# Patient Record
Sex: FEMALE | Race: WHITE | URBAN - METROPOLITAN AREA
[De-identification: names, ages, dates, MRNs, and addresses within clinical notes are randomized per-mention and may not be internally consistent; named-entity substitution may affect disease eponyms.]

---

## 2019-12-30 ENCOUNTER — APPOINTMENT (RX ONLY)
Dept: URBAN - METROPOLITAN AREA CLINIC 71 | Facility: CLINIC | Age: 29
Setting detail: DERMATOLOGY
End: 2019-12-30

## 2019-12-30 ENCOUNTER — APPOINTMENT (RX ONLY)
Dept: URBAN - METROPOLITAN AREA CLINIC 70 | Facility: CLINIC | Age: 29
Setting detail: DERMATOLOGY
End: 2019-12-30

## 2019-12-30 DIAGNOSIS — R21 RASH AND OTHER NONSPECIFIC SKIN ERUPTION: ICD-10-CM

## 2019-12-30 PROCEDURE — ? COUNSELING

## 2019-12-30 PROCEDURE — ? PRESCRIPTION

## 2019-12-30 PROCEDURE — ? TREATMENT REGIMEN

## 2019-12-30 PROCEDURE — 99202 OFFICE O/P NEW SF 15 MIN: CPT

## 2019-12-30 RX ADMIN — TRIAMCINOLONE ACETONIDE 1: 1 OINTMENT TOPICAL at 00:00

## 2019-12-30 ASSESSMENT — LOCATION SIMPLE DESCRIPTION DERM
LOCATION SIMPLE: RIGHT HAND
LOCATION SIMPLE: LEFT HAND
LOCATION SIMPLE: LEFT ELBOW
LOCATION SIMPLE: LEFT ANKLE
LOCATION SIMPLE: LEFT ANKLE
LOCATION SIMPLE: LEFT ELBOW
LOCATION SIMPLE: RIGHT PRETIBIAL REGION
LOCATION SIMPLE: RIGHT HAND
LOCATION SIMPLE: RIGHT PRETIBIAL REGION
LOCATION SIMPLE: RIGHT KNEE
LOCATION SIMPLE: RIGHT KNEE
LOCATION SIMPLE: LEFT KNEE
LOCATION SIMPLE: LEFT KNEE
LOCATION SIMPLE: RIGHT ELBOW
LOCATION SIMPLE: LEFT HAND
LOCATION SIMPLE: RIGHT ELBOW

## 2019-12-30 ASSESSMENT — LOCATION DETAILED DESCRIPTION DERM
LOCATION DETAILED: RIGHT DISTAL PRETIBIAL REGION
LOCATION DETAILED: RIGHT DISTAL PRETIBIAL REGION
LOCATION DETAILED: LEFT ELBOW
LOCATION DETAILED: RIGHT ELBOW
LOCATION DETAILED: RIGHT ELBOW
LOCATION DETAILED: LEFT KNEE
LOCATION DETAILED: LEFT ULNAR PALM
LOCATION DETAILED: RIGHT KNEE
LOCATION DETAILED: LEFT ELBOW
LOCATION DETAILED: LEFT ULNAR PALM
LOCATION DETAILED: RIGHT KNEE
LOCATION DETAILED: LEFT ANKLE
LOCATION DETAILED: LEFT KNEE
LOCATION DETAILED: RIGHT ULNAR PALM
LOCATION DETAILED: LEFT ANKLE
LOCATION DETAILED: RIGHT ULNAR PALM

## 2019-12-30 ASSESSMENT — LOCATION ZONE DERM
LOCATION ZONE: LEG
LOCATION ZONE: LEG
LOCATION ZONE: ARM
LOCATION ZONE: ARM
LOCATION ZONE: HAND
LOCATION ZONE: HAND

## 2019-12-30 NOTE — PROCEDURE: TREATMENT REGIMEN
Plan: vanicream with hand washing.\\nTAC oint BID to hands, feet; do not treat elbows and knees to prepare for biopsy.\\nWill plan to biopsy at follow up. Due to new nature in onset, will allow for further development of eruption to increase yield of bx in 2 weeks.\\nPer pt, she has been worked up for psoriatic arthritis and has not shown signs of that
Detail Level: Generalized

## 2019-12-30 NOTE — PROCEDURE: TREATMENT REGIMEN
Detail Level: Generalized
Plan: vanicream with hand washing.\\nTAC oint BID to hands, feet; do not treat elbows and knees to prepare for biopsy.\\nWill plan to biopsy at follow up. Due to new nature in onset, will allow for further development of eruption to increase yield of bx in 2 weeks.\\nPer pt, she has been worked up for psoriatic arthritis and has not shown signs of that

## 2019-12-30 NOTE — HPI: RASH
What Type Of Note Output Would You Prefer (Optional)?: Standard Output
Is The Patient Presenting As Previously Scheduled?: Yes
How Severe Is Your Rash?: moderate
Is This A New Presentation, Or A Follow-Up?: Rash
Additional History: States she was diagnosed with right hip gout in 2017. Stopped the med a couple of days ago to prepare for pregnancy.  She sees rheumatology Dr. Minaya

## 2019-12-30 NOTE — HPI: RASH
What Type Of Note Output Would You Prefer (Optional)?: Standard Output
Is The Patient Presenting As Previously Scheduled?: Yes
How Severe Is Your Rash?: moderate
Is This A New Presentation, Or A Follow-Up?: Rash
Additional History: States she was diagnosed with right hip gout in 2017. Stopped the med a couple of days ago to prepare for pregnancy.  She sees rheumatology Dr. Lorenz

## 2019-12-31 RX ORDER — TRIAMCINOLONE ACETONIDE 1 MG/G
1 OINTMENT TOPICAL BID
Qty: 1 | Refills: 0 | Status: ERX | COMMUNITY
Start: 2019-12-30

## 2020-01-14 ENCOUNTER — APPOINTMENT (RX ONLY)
Dept: URBAN - METROPOLITAN AREA CLINIC 70 | Facility: CLINIC | Age: 30
Setting detail: DERMATOLOGY
End: 2020-01-14

## 2020-01-14 ENCOUNTER — APPOINTMENT (RX ONLY)
Dept: URBAN - METROPOLITAN AREA CLINIC 71 | Facility: CLINIC | Age: 30
Setting detail: DERMATOLOGY
End: 2020-01-14

## 2020-01-14 DIAGNOSIS — R21 RASH AND OTHER NONSPECIFIC SKIN ERUPTION: ICD-10-CM

## 2020-01-14 DIAGNOSIS — L26 EXFOLIATIVE DERMATITIS: ICD-10-CM

## 2020-01-14 PROCEDURE — 11104 PUNCH BX SKIN SINGLE LESION: CPT

## 2020-01-14 PROCEDURE — ? COUNSELING

## 2020-01-14 PROCEDURE — ? BIOPSY BY PUNCH METHOD

## 2020-01-14 PROCEDURE — ? TREATMENT REGIMEN

## 2020-01-14 PROCEDURE — 99213 OFFICE O/P EST LOW 20 MIN: CPT | Mod: 25

## 2020-01-14 ASSESSMENT — LOCATION DETAILED DESCRIPTION DERM
LOCATION DETAILED: LEFT ULNAR PALM
LOCATION DETAILED: LEFT PLANTAR FOREFOOT OVERLYING 3RD METATARSAL
LOCATION DETAILED: RIGHT ACHILLES SKIN
LOCATION DETAILED: LEFT THENAR EMINENCE
LOCATION DETAILED: RIGHT MEDIAL PLANTAR MIDFOOT
LOCATION DETAILED: RIGHT PLANTAR FOREFOOT OVERLYING 2ND METATARSAL
LOCATION DETAILED: LEFT PLANTAR FOREFOOT OVERLYING 3RD METATARSAL
LOCATION DETAILED: RIGHT THENAR EMINENCE
LOCATION DETAILED: RIGHT PLANTAR FOREFOOT OVERLYING 2ND METATARSAL
LOCATION DETAILED: RIGHT INSTEP
LOCATION DETAILED: RIGHT ACHILLES SKIN
LOCATION DETAILED: RIGHT THENAR EMINENCE
LOCATION DETAILED: LEFT MEDIAL PLANTAR MIDFOOT
LOCATION DETAILED: LEFT MEDIAL PLANTAR HEEL
LOCATION DETAILED: LEFT THENAR EMINENCE
LOCATION DETAILED: LEFT MEDIAL PLANTAR HEEL
LOCATION DETAILED: RIGHT INSTEP
LOCATION DETAILED: LEFT MEDIAL PLANTAR MIDFOOT
LOCATION DETAILED: RIGHT MEDIAL PLANTAR MIDFOOT
LOCATION DETAILED: LEFT ULNAR PALM

## 2020-01-14 ASSESSMENT — LOCATION SIMPLE DESCRIPTION DERM
LOCATION SIMPLE: RIGHT PLANTAR SURFACE
LOCATION SIMPLE: LEFT PLANTAR SURFACE
LOCATION SIMPLE: RIGHT HAND
LOCATION SIMPLE: RIGHT ACHILLES SKIN
LOCATION SIMPLE: LEFT HAND
LOCATION SIMPLE: LEFT PLANTAR SURFACE
LOCATION SIMPLE: RIGHT HAND
LOCATION SIMPLE: RIGHT ACHILLES SKIN
LOCATION SIMPLE: RIGHT PLANTAR SURFACE
LOCATION SIMPLE: LEFT HAND

## 2020-01-14 ASSESSMENT — LOCATION ZONE DERM
LOCATION ZONE: FEET
LOCATION ZONE: LEG
LOCATION ZONE: LEG
LOCATION ZONE: HAND
LOCATION ZONE: HAND
LOCATION ZONE: FEET

## 2020-01-14 NOTE — PROCEDURE: BIOPSY BY PUNCH METHOD
Render In Bullet Format When Appropriate: No
Dressing: bandage
Was A Bandage Applied: Yes
Punch Size In Mm: 4
Anesthesia Volume In Cc: 0.5
Epidermal Sutures: 4-0 Polysorb
Notification Instructions: Patient will follow up in 2 weeks for biopsy results.
Size Of Lesion In Cm (Optional): 0
Wound Care: Aquaphor
Detail Level: Detailed
Home Suture Removal Text: Patient was provided a home suture removal kit and will remove their sutures at home.  If they have any questions or difficulties they will call the office.
Additional Anesthesia Volume In Cc (Will Not Render If 0): 6
Consent: Verbal consent was obtained and risks were reviewed including but not limited to scarring, infection, bleeding, scabbing, incomplete removal, nerve damage and allergy to anesthesia.
Anesthesia Type: 1% lidocaine with epinephrine
Biopsy Type: H and E
Billing Type: Third-Party Bill
Suture Removal: 14 days
Post-Care Instructions: I reviewed with the patient in detail post-care instructions. Patient is to keep the biopsy site dry overnight, and then apply bacitracin twice daily until healed. Patient may apply hydrogen peroxide soaks to remove any crusting.
Hemostasis: None

## 2020-01-14 NOTE — PROCEDURE: TREATMENT REGIMEN
Plan: Vaniply, cover with manicure  gloves.\\nr/o strep-associated exfoliative dermatitis of hands/feet. Advised pt  to  see PCP for strep culture.\\nTAC  oint BID to  feet
Detail Level: Generalized

## 2020-01-14 NOTE — PROCEDURE: BIOPSY BY PUNCH METHOD
Detail Level: Detailed
Was A Bandage Applied: Yes
Punch Size In Mm: 4
Biopsy Type: H and E
Anesthesia Type: 1% lidocaine with epinephrine
Anesthesia Volume In Cc: 0.5
Additional Anesthesia Volume In Cc (Will Not Render If 0): 6
Hemostasis: None
Epidermal Sutures: 4-0 Polysorb
Wound Care: Aquaphor
Dressing: bandage
Suture Removal: 14 days
Patient Will Remove Sutures At Home?: No
Size Of Lesion In Cm (Optional): 0
Lab: [id number]
Lab Facility: [id number]
Consent: Verbal consent was obtained and risks were reviewed including but not limited to scarring, infection, bleeding, scabbing, incomplete removal, nerve damage and allergy to anesthesia.
Post-Care Instructions: I reviewed with the patient in detail post-care instructions. Patient is to keep the biopsy site dry overnight, and then apply bacitracin twice daily until healed. Patient may apply hydrogen peroxide soaks to remove any crusting.
Home Suture Removal Text: Patient was provided a home suture removal kit and will remove their sutures at home.  If they have any questions or difficulties they will call the office.
Notification Instructions: Patient will follow up in 2 weeks for biopsy results.
Billing Type: Third-Party Bill

## 2020-01-30 ENCOUNTER — APPOINTMENT (RX ONLY)
Dept: URBAN - METROPOLITAN AREA CLINIC 70 | Facility: CLINIC | Age: 30
Setting detail: DERMATOLOGY
End: 2020-01-30

## 2020-01-30 ENCOUNTER — APPOINTMENT (RX ONLY)
Dept: URBAN - METROPOLITAN AREA CLINIC 71 | Facility: CLINIC | Age: 30
Setting detail: DERMATOLOGY
End: 2020-01-30

## 2020-01-30 DIAGNOSIS — L259 CONTACT DERMATITIS AND OTHER ECZEMA, UNSPECIFIED CAUSE: ICD-10-CM | Status: IMPROVED

## 2020-01-30 DIAGNOSIS — L60.3 NAIL DYSTROPHY: ICD-10-CM

## 2020-01-30 PROBLEM — L30.8 OTHER SPECIFIED DERMATITIS: Status: ACTIVE | Noted: 2020-01-30

## 2020-01-30 PROCEDURE — ? COUNSELING

## 2020-01-30 PROCEDURE — 99214 OFFICE O/P EST MOD 30 MIN: CPT

## 2020-01-30 PROCEDURE — ? TREATMENT REGIMEN

## 2020-01-30 ASSESSMENT — LOCATION ZONE DERM
LOCATION ZONE: LEG
LOCATION ZONE: LEG
LOCATION ZONE: FINGERNAIL
LOCATION ZONE: FEET
LOCATION ZONE: FINGER
LOCATION ZONE: FEET
LOCATION ZONE: HAND
LOCATION ZONE: FINGERNAIL
LOCATION ZONE: FINGER
LOCATION ZONE: HAND

## 2020-01-30 ASSESSMENT — LOCATION DETAILED DESCRIPTION DERM
LOCATION DETAILED: LEFT SMALL FINGERNAIL
LOCATION DETAILED: RIGHT ACHILLES SKIN
LOCATION DETAILED: PERIUNGUAL SKIN LEFT RING FINGER
LOCATION DETAILED: RIGHT ACHILLES SKIN
LOCATION DETAILED: RIGHT MIDDLE FINGERNAIL
LOCATION DETAILED: LEFT POSTERIOR MEDIAL MALLEOLUS
LOCATION DETAILED: LEFT MIDDLE FINGERNAIL
LOCATION DETAILED: LEFT ULNAR PALM
LOCATION DETAILED: LEFT SMALL FINGERNAIL
LOCATION DETAILED: RIGHT INDEX FINGERNAIL
LOCATION DETAILED: LEFT MEDIAL PLANTAR HEEL
LOCATION DETAILED: RIGHT SMALL FINGERNAIL
LOCATION DETAILED: RIGHT INDEX FINGERNAIL
LOCATION DETAILED: PERIUNGUAL SKIN LEFT THUMB
LOCATION DETAILED: PERIUNGUAL SKIN LEFT RING FINGER
LOCATION DETAILED: RIGHT MIDDLE FINGERNAIL
LOCATION DETAILED: RIGHT RING FINGERNAIL
LOCATION DETAILED: LEFT MEDIAL PLANTAR HEEL
LOCATION DETAILED: LEFT PLANTAR FOREFOOT OVERLYING 2ND METATARSAL
LOCATION DETAILED: RIGHT MEDIAL PLANTAR HEEL
LOCATION DETAILED: RIGHT SMALL FINGERNAIL
LOCATION DETAILED: LEFT INDEX FINGERNAIL
LOCATION DETAILED: LEFT POSTERIOR MEDIAL MALLEOLUS
LOCATION DETAILED: RIGHT RING FINGERNAIL
LOCATION DETAILED: RIGHT PLANTAR FOREFOOT OVERLYING 2ND METATARSAL
LOCATION DETAILED: LEFT ULNAR PALM
LOCATION DETAILED: RIGHT PLANTAR FOREFOOT OVERLYING 2ND METATARSAL
LOCATION DETAILED: RIGHT THENAR EMINENCE
LOCATION DETAILED: LEFT INDEX FINGERNAIL
LOCATION DETAILED: LEFT MIDDLE FINGERNAIL
LOCATION DETAILED: LEFT PLANTAR FOREFOOT OVERLYING 2ND METATARSAL
LOCATION DETAILED: PERIUNGUAL SKIN LEFT THUMB
LOCATION DETAILED: RIGHT MEDIAL PLANTAR HEEL
LOCATION DETAILED: RIGHT THENAR EMINENCE

## 2020-01-30 ASSESSMENT — LOCATION SIMPLE DESCRIPTION DERM
LOCATION SIMPLE: RIGHT RING FINGERNAIL
LOCATION SIMPLE: LEFT HAND
LOCATION SIMPLE: RIGHT SMALL FINGERNAIL
LOCATION SIMPLE: RIGHT HAND
LOCATION SIMPLE: LEFT INDEX FINGERNAIL
LOCATION SIMPLE: LEFT MIDDLE FINGERNAIL
LOCATION SIMPLE: LEFT PLANTAR SURFACE
LOCATION SIMPLE: LEFT THUMB
LOCATION SIMPLE: LEFT PLANTAR SURFACE
LOCATION SIMPLE: RIGHT HAND
LOCATION SIMPLE: RIGHT MIDDLE FINGERNAIL
LOCATION SIMPLE: RIGHT INDEX FINGERNAIL
LOCATION SIMPLE: LEFT SMALL FINGERNAIL
LOCATION SIMPLE: RIGHT INDEX FINGERNAIL
LOCATION SIMPLE: RIGHT RING FINGERNAIL
LOCATION SIMPLE: RIGHT SMALL FINGERNAIL
LOCATION SIMPLE: LEFT RING FINGER
LOCATION SIMPLE: LEFT INDEX FINGERNAIL
LOCATION SIMPLE: RIGHT MIDDLE FINGERNAIL
LOCATION SIMPLE: LEFT THUMB
LOCATION SIMPLE: RIGHT ACHILLES SKIN
LOCATION SIMPLE: RIGHT PLANTAR SURFACE
LOCATION SIMPLE: LEFT HAND
LOCATION SIMPLE: LEFT SMALL FINGERNAIL
LOCATION SIMPLE: RIGHT PLANTAR SURFACE
LOCATION SIMPLE: RIGHT ACHILLES SKIN
LOCATION SIMPLE: LEFT MIDDLE FINGERNAIL
LOCATION SIMPLE: LEFT RING FINGER
LOCATION SIMPLE: LEFT ANKLE
LOCATION SIMPLE: LEFT ANKLE

## 2020-01-30 NOTE — PROCEDURE: TREATMENT REGIMEN
Continue Regimen: TAC to  soles.
Detail Level: Generalized
Plan: Etiology unclear. Biopsy  suggested eczematous  process or  partially treated psoriasis.  Still  consider viral, strep or psoriasis.   \\nWill  continue to evaluate. She  will  RTC for worsening. \\nShe  completed antibiotic course  to cover  for possible strep infection.\\nDesquamation  now improving on palms and soles.   \\nWill follow.
Plan: wear nails naturally. Area of demarcation appears near gel polish. Will follow for additional clues.

## 2020-01-30 NOTE — PROCEDURE: TREATMENT REGIMEN
Plan: Etiology unclear. Biopsy  suggested eczematous  process or  partially treated psoriasis.  Still  consider viral, strep or psoriasis.   \\nWill  continue to evaluate. She  will  RTC for worsening. \\nShe  completed antibiotic course  to cover  for possible strep infection.\\nDesquamation  now improving on palms and soles.   \\nWill follow.
Detail Level: Generalized
Plan: wear nails naturally. Area of demarcation appears near gel polish. Will follow for additional clues.
Continue Regimen: TAC to  soles.

## 2024-06-11 ENCOUNTER — APPOINTMENT (RX ONLY)
Dept: URBAN - METROPOLITAN AREA CLINIC 69 | Facility: CLINIC | Age: 34
Setting detail: DERMATOLOGY
End: 2024-06-11

## 2024-06-11 DIAGNOSIS — D22 MELANOCYTIC NEVI: ICD-10-CM

## 2024-06-11 DIAGNOSIS — L82.1 OTHER SEBORRHEIC KERATOSIS: ICD-10-CM

## 2024-06-11 DIAGNOSIS — D18.0 HEMANGIOMA: ICD-10-CM

## 2024-06-11 DIAGNOSIS — Z80.8 FAMILY HISTORY OF MALIGNANT NEOPLASM OF OTHER ORGANS OR SYSTEMS: ICD-10-CM

## 2024-06-11 DIAGNOSIS — Z71.89 OTHER SPECIFIED COUNSELING: ICD-10-CM

## 2024-06-11 DIAGNOSIS — B07.8 OTHER VIRAL WARTS: ICD-10-CM

## 2024-06-11 DIAGNOSIS — L81.4 OTHER MELANIN HYPERPIGMENTATION: ICD-10-CM

## 2024-06-11 PROBLEM — D18.01 HEMANGIOMA OF SKIN AND SUBCUTANEOUS TISSUE: Status: ACTIVE | Noted: 2024-06-11

## 2024-06-11 PROBLEM — D22.5 MELANOCYTIC NEVI OF TRUNK: Status: ACTIVE | Noted: 2024-06-11

## 2024-06-11 PROBLEM — D22.72 MELANOCYTIC NEVI OF LEFT LOWER LIMB, INCLUDING HIP: Status: ACTIVE | Noted: 2024-06-11

## 2024-06-11 PROCEDURE — ? SUNSCREEN RECOMMENDATIONS

## 2024-06-11 PROCEDURE — ? ADDITIONAL NOTES

## 2024-06-11 PROCEDURE — ? LIQUID NITROGEN

## 2024-06-11 PROCEDURE — 17110 DESTRUCTION B9 LES UP TO 14: CPT

## 2024-06-11 PROCEDURE — 99203 OFFICE O/P NEW LOW 30 MIN: CPT | Mod: 25

## 2024-06-11 PROCEDURE — ? COUNSELING

## 2024-06-11 ASSESSMENT — LOCATION DETAILED DESCRIPTION DERM
LOCATION DETAILED: RIGHT SUPERIOR MEDIAL UPPER BACK
LOCATION DETAILED: LEFT RADIAL DORSAL HAND
LOCATION DETAILED: LEFT INFERIOR CENTRAL MALAR CHEEK
LOCATION DETAILED: LEFT MEDIAL 3RD TOE

## 2024-06-11 ASSESSMENT — LOCATION ZONE DERM
LOCATION ZONE: FACE
LOCATION ZONE: TRUNK
LOCATION ZONE: HAND
LOCATION ZONE: TOE

## 2024-06-11 ASSESSMENT — LOCATION SIMPLE DESCRIPTION DERM
LOCATION SIMPLE: LEFT HAND
LOCATION SIMPLE: LEFT CHEEK
LOCATION SIMPLE: LEFT 3RD TOE
LOCATION SIMPLE: RIGHT UPPER BACK

## 2024-06-11 NOTE — PROCEDURE: LIQUID NITROGEN
Post-Care Instructions: I reviewed with the patient in detail post-care instructions. Patient is to wear sunprotection, and avoid picking at any of the treated lesions. Pt may apply Vaseline to crusted or scabbing areas.
Render Post-Care Instructions In Note?: no
Detail Level: Detailed
Show Aperture Variable?: Yes
Spray Paint Text: The liquid nitrogen was applied to the skin utilizing a spray paint frosting technique.
Consent: The patient's consent was obtained including but not limited to risks of crusting, scabbing, blistering, scarring, darker or lighter pigmentary change, recurrence, incomplete removal and infection.
Medical Necessity Clause: This procedure was medically necessary because the lesions that were treated were:
Medical Necessity Information: It is in your best interest to select a reason for this procedure from the list below. All of these items fulfill various CMS LCD requirements except the new and changing color options.